# Patient Record
Sex: MALE
[De-identification: names, ages, dates, MRNs, and addresses within clinical notes are randomized per-mention and may not be internally consistent; named-entity substitution may affect disease eponyms.]

---

## 2022-03-18 ENCOUNTER — DASHBOARD ENCOUNTERS (OUTPATIENT)
Age: 67
End: 2022-03-18

## 2022-03-18 ENCOUNTER — LAB OUTSIDE AN ENCOUNTER (OUTPATIENT)
Dept: URBAN - METROPOLITAN AREA CLINIC 19 | Facility: CLINIC | Age: 67
End: 2022-03-18

## 2022-03-18 ENCOUNTER — OFFICE VISIT (OUTPATIENT)
Dept: URBAN - METROPOLITAN AREA CLINIC 19 | Facility: CLINIC | Age: 67
End: 2022-03-18
Payer: MEDICARE

## 2022-03-18 DIAGNOSIS — Z12.11 COLON CANCER SCREENING: ICD-10-CM

## 2022-03-18 PROCEDURE — 993 AGA: Performed by: NURSE PRACTITIONER

## 2022-03-18 PROCEDURE — 99243 OFF/OP CNSLTJ NEW/EST LOW 30: CPT | Performed by: NURSE PRACTITIONER

## 2022-03-18 RX ORDER — SODIUM, POTASSIUM,MAG SULFATES 17.5-3.13G
354ML SOLUTION, RECONSTITUTED, ORAL ORAL
Qty: 354 MILLILITER | Refills: 0 | OUTPATIENT
Start: 2022-03-18 | End: 2022-03-19

## 2022-03-18 NOTE — HPI-TODAY'S VISIT:
The patient was referred by Dr. Leif Rosado for colon cancer screening.   A copy of this document is being forwarded to the referring provider.    Mr. Hudson is a 66 year-old male who presents for colon cancer screening. Last colonoscopy: 2009. No polyps. No family history of colon polyps or colon cancer. Patient denies nausea, heartburn, dysphagia, abdominal pain, rectal bleeding, unintentional weight loss, and loss of appetite. He has a bowel movement everyday, which is soft and formed. He denies cardiac/kidney/lung disease, diabetes, blood thinners, and home O2. He takes plavix for a TIA 16 years ago and it is prescribed by Dr. Rosado.

## 2022-03-30 ENCOUNTER — TELEPHONE ENCOUNTER (OUTPATIENT)
Dept: URBAN - METROPOLITAN AREA CLINIC 19 | Facility: CLINIC | Age: 67
End: 2022-03-30

## 2022-03-30 RX ORDER — SODIUM, POTASSIUM,MAG SULFATES 17.5-3.13G
354ML SOLUTION, RECONSTITUTED, ORAL ORAL
Qty: 354 MILLILITER | Refills: 0
Start: 2022-03-18 | End: 2022-04-01

## 2022-05-11 ENCOUNTER — OFFICE VISIT (OUTPATIENT)
Dept: URBAN - METROPOLITAN AREA MEDICAL CENTER 25 | Facility: MEDICAL CENTER | Age: 67
End: 2022-05-11
Payer: MEDICARE

## 2022-05-11 DIAGNOSIS — Z12.11 COLON CANCER SCREENING: ICD-10-CM

## 2022-05-11 PROCEDURE — G0121 COLON CA SCRN NOT HI RSK IND: HCPCS | Performed by: INTERNAL MEDICINE
